# Patient Record
Sex: FEMALE | Race: WHITE
[De-identification: names, ages, dates, MRNs, and addresses within clinical notes are randomized per-mention and may not be internally consistent; named-entity substitution may affect disease eponyms.]

---

## 2018-01-09 ENCOUNTER — HOSPITAL ENCOUNTER (OUTPATIENT)
Dept: HOSPITAL 47 - RADUSWWP | Age: 83
End: 2018-01-09
Attending: SURGERY
Payer: MEDICARE

## 2018-01-09 VITALS — RESPIRATION RATE: 16 BRPM

## 2018-01-09 VITALS — HEART RATE: 64 BPM | DIASTOLIC BLOOD PRESSURE: 73 MMHG | TEMPERATURE: 97.6 F | SYSTOLIC BLOOD PRESSURE: 137 MMHG

## 2018-01-09 VITALS — BODY MASS INDEX: 18.8 KG/M2

## 2018-01-09 DIAGNOSIS — N60.31: Primary | ICD-10-CM

## 2018-01-09 DIAGNOSIS — Z78.0: ICD-10-CM

## 2018-01-09 DIAGNOSIS — Z85.9: ICD-10-CM

## 2018-01-09 PROCEDURE — 88305 TISSUE EXAM BY PATHOLOGIST: CPT

## 2018-01-09 PROCEDURE — 77065 DX MAMMO INCL CAD UNI: CPT

## 2018-01-09 PROCEDURE — 19083 BX BREAST 1ST LESION US IMAG: CPT

## 2018-01-09 NOTE — USB
ULTRASOUND GUIDED CORE BIOPSY RIGHT BREAST:

 

CLINICAL HISTORY: Abnormal ultrasound

 

FINDINGS: 

The procedure was explained to the patient.  The risks, complications, benefits 
and alternatives were discussed and any questions were answered.  Informed 
consent was obtained.  Patient was placed supine on the ultrasound table and 
prepped and draped in the usual sterile fashion.  Utilizing a 14 gauge needle, 
five passes were made into the area of concern in the subareolar portion right 
breast. Previously reported 5:00 lesion was not seen or reproducible on today's 
exam and therefore cannot be biopsied. Biopsy clip was placed post procedural 
and subsequent mammogram demonstrated the clip to be in ideal position.

 

Patient was stable throughout the procedure.  Pathology is pending.

 

All elements of maximal barrier and sterile technique were utilized.

 

IMPRESSION: 

1.  Successful ultrasound guided core biopsy of the subareolar right breast 
requested lesion.

 

Pathology Results: Benign



BREAST, RIGHT, CORE BIOPSY: BENIGN BREAST AND FIBROADIPOSE TISSUE WITH 
PROMINENT FIBROSIS. 



Recommendation

Follow up ultrasound of the right breast in 6 months.

Surgical consult to assess nipple discharge symptoms.
PRADIP

## 2018-01-09 NOTE — MM
Reason for exam: additional evaluation requested from abnormal screening.

Last mammogram was performed 1 month ago.



History:

Patient is postmenopausal and has history of other cancer at age 40.

Took estrogen for 15 years.



[facility] Diagnostic Mammo RT Wo CAD

CC and MLO view(s) were taken of the right breast.

Prior study comparison: December 13, 2017, bilateral MG 3d diag mammo w/cad [facility].  

December 12, 2016, bilateral MG 3d screening mammo w/cad.



ASSESSMENT: Post procedure mammogram for marker placement



RECOMMENDATION:

Ultrasound of the right breast in 6 months.

PENDING PATHOLOGY RESULTS.

## 2019-09-29 ENCOUNTER — HOSPITAL ENCOUNTER (EMERGENCY)
Dept: HOSPITAL 47 - EC | Age: 84
Discharge: HOME | End: 2019-09-29
Payer: MEDICARE

## 2019-09-29 VITALS — DIASTOLIC BLOOD PRESSURE: 78 MMHG | TEMPERATURE: 98 F | SYSTOLIC BLOOD PRESSURE: 120 MMHG | HEART RATE: 68 BPM

## 2019-09-29 VITALS — RESPIRATION RATE: 18 BRPM

## 2019-09-29 DIAGNOSIS — R06.02: ICD-10-CM

## 2019-09-29 DIAGNOSIS — Z88.5: ICD-10-CM

## 2019-09-29 DIAGNOSIS — S22.030A: Primary | ICD-10-CM
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
## 2019-09-29 DIAGNOSIS — F02.80: ICD-10-CM

## 2019-09-29 DIAGNOSIS — E07.9: ICD-10-CM

## 2019-09-29 DIAGNOSIS — Z79.899: ICD-10-CM

## 2019-09-29 DIAGNOSIS — R07.9: ICD-10-CM

## 2019-09-29 DIAGNOSIS — H53.8: ICD-10-CM

## 2019-09-29 DIAGNOSIS — G30.9: ICD-10-CM

## 2019-09-29 DIAGNOSIS — Z79.890: ICD-10-CM

## 2019-09-29 DIAGNOSIS — S32.010A: ICD-10-CM

## 2019-09-29 PROCEDURE — 72170 X-RAY EXAM OF PELVIS: CPT

## 2019-09-29 PROCEDURE — 72125 CT NECK SPINE W/O DYE: CPT

## 2019-09-29 PROCEDURE — 71046 X-RAY EXAM CHEST 2 VIEWS: CPT

## 2019-09-29 PROCEDURE — 99284 EMERGENCY DEPT VISIT MOD MDM: CPT

## 2019-09-29 PROCEDURE — 72131 CT LUMBAR SPINE W/O DYE: CPT

## 2019-09-29 PROCEDURE — 70450 CT HEAD/BRAIN W/O DYE: CPT

## 2019-09-29 PROCEDURE — 72100 X-RAY EXAM L-S SPINE 2/3 VWS: CPT

## 2019-09-29 NOTE — CT
EXAMINATION TYPE: CT lumbar spine wo con

 

DATE OF EXAM: 9/29/2019 10:18 AM

 

COMPARISON: None.

 

HISTORY: L1 FX, Fall

 

CT DLP: 462.8 mGycm

Automated exposure control for dose reduction was used.

 

Unenhanced CT of the lumbar spine was performed.  Bone and soft tissue window settings are submitted 
as well as coronal and sagittal reconstructions. 

 

FINDINGS: There are tiny, bilateral pleural effusions with associated relaxation atelectasis. The hea
rt is enlarged. Paraspinal soft tissues are otherwise unremarkable.

 

At T11-12, there is a superior endplate of fracture and the T12 vertebral body. This appears chronic.
 There is no paraspinal soft tissue mass or clear fracture line visible.

 

At T12-L1, there is a superior endplate infraction of T12. There is a faint fracture line through the
 vertebral body. I'm unsure the chronicity of this lesion. There is no paraspinal soft tissue mass.

 

L1-L2: No definite abnormality is seen.

 

L2-L3: There is a diffuse disc displacement. Intervertebral foramina are well maintained. There is mi
ld hypertrophic change in the facets.

 

L3-L4: Intervertebral foramina are widely patent. There is a broad-based disc protrusion. There is hy
pertrophic changes in the facets. There is moderate central canal stenosis.

 

L4-L5: There is a grade 1 bordering on grade 2 degenerative spondylolisthesis of L4 and L5. There is 
a prominent pseudodisc. There are marked hypertrophic changes within the facets. Intervertebral rio
sujit appear maintained.

 

L5-S1: There is degenerative disc disease. There is no cyst significant compressive discopathy. Inter
vertebral foramina appear well maintained. There are hypertrophic changes

 

IMPRESSION:

 

1. SEVERE WEDGE COMPRESSION FRACTURE OF T11 APPEARS CHRONIC.

2. SUPERIOR ENDPLATE INFRACTION OF L1 IS AGE-INDETERMINATE. FRACTURE LINE IS STILL VISIBLE WITHOUT PA
RASPINAL HEMATOMA OR SOFT TISSUE MASS.

3. DEGENERATIVE DISC DISEASE AND FACET ARTHROPATHY THROUGHOUT THE LUMBAR SPINE.

4. GRADE 1 BORDERING ON GRADE 2 SPONDYLOLISTHESIS OF L4 ON L5 WHICH IS DEGENERATIVE.

5. MODERATE CENTRAL CANAL STENOSIS, L3-4

6. SMALL, BILATERAL PLEURAL EFFUSIONS.

## 2019-09-29 NOTE — XR
EXAMINATION TYPE: XR lumbar spine 2 or 3V , 3 VIEWS

 

DATE OF EXAM ORDERED: 9/29/2019

 

HISTORY: pain.

 

COMPARISON: None.

 

FINDINGS:  There is a degenerative grade 1 bordering on grade 2 spondylolisthesis of L4 on L5. Verteb
ral body height and alignment are otherwise maintained. There are superior endplate infractions at T1
2 and L1, age not determined. There is no spondylolisthesis. There is facet arthropathy at L4-5 and L
5-S1. The pedicles are intact.

 

Incidental note is made of calcification of the right upper quadrant which may represent gallstones.

 

IMPRESSION: 

1. SUPER ENDPLATE INFRACTIONS OF T12 AND L1, AGE NOT DETERMINED.

2. GRADE 1 BORDERING ON GRADE 2 SPONDYLOLISTHESIS OF L4 AND L5.

3. PROBABLE CHOLELITHIASIS

## 2019-09-29 NOTE — XR
EXAMINATION TYPE: XR pelvis AP view , ONE VIEW

 

DATE OF EXAM ORDERED: 9/29/2019

 

HISTORY: fall low back pain.

 

COMPARISON: None.

 

FINDINGS:  Bony structures about the pelvis are unremarkable. No fracture or dislocation is seen. Hip
 joints are maintained.

 

IMPRESSION: 

 

NORMAL PELVIS.

## 2019-09-29 NOTE — ED
Fall HPI





- General


Chief Complaint: Fall


Stated Complaint: Fall


Time Seen by Provider: 19 07:32


Source: patient


Mode of arrival: EMS





- History of Present Illness


Initial Comments: 


84 year old female presents emergency department for chief complaint of fall.  

Patient states that she has a bedside commode which she is supposed to use ever 

she wants walk to the bathroom she states that she used her walker lost her 

balance falling onto her bottom.  Patient complaining of low back pain. Denies 

hip pain. Denies knee, ankle or UE pain.  Patient states she did not have neck 

pain until the c-collar was applied, she states it is very uncomfortable 

especially with her head tremors (history of possible parkinsons).  Patient 

denies any headache dizziness.  Patient has a chest pain shortness of breath, 

visual changes. Patient unsure if she hit her head. Denies LOC. Denies 

anticoagulation use. Patient has no other complaints. Appears well on arrival c-

collar in place.








- Related Data


                                Home Medications











 Medication  Instructions  Recorded  Confirmed


 


Latanoprost Ophth [Xalatan 0.005%] 1 drops BOTH EYES HS 16


 


Levothyroxine Sodium [Tirosint] 25 mcg PO DAILY 16


 


Donepezil [Aricept] 10 mg PO DAILY 19


 


QUEtiapine [SEROquel] 12.5 mg PO DAILY@1600 19











                                    Allergies











Allergy/AdvReac Type Severity Reaction Status Date / Time


 


codeine AdvReac  Nausea & Verified 19 08:10





   Vomiting  














Review of Systems


ROS Statement: 


Those systems with pertinent positive or pertinent negative responses have been 

documented in the HPI.





ROS Other: All systems not noted in ROS Statement are negative.





Past Medical History


Past Medical History: Dementia, Thyroid Disorder


Additional Past Medical History / Comment(s): alzheimers


History of Any Multi-Drug Resistant Organisms: None Reported


Past Surgical History:  Section, Hysterectomy


Past Anesthesia/Blood Transfusion Reactions: No Reported Reaction


Past Psychological History: No Psychological Hx Reported


Smoking Status: Never smoker


Past Alcohol Use History: None Reported


Past Drug Use History: None Reported





General Exam





- General Exam Comments


Initial Comments: 


General:  The patient is awake and alert, in no distress, and does not appear 

acutely ill. 


Eye:  +3 mm pupils are equal, round and reactive to light, extra-ocular 

movements are intact.  No nystagmus. No APD.  There is normal conjunctiva 

bilaterally.  No signs of icterus.  


Ears, nose, mouth and throat:  There are moist mucous membranes and no oral 

lesions.  No raccoon or Thorpe sign.  No scalp contusions abrasions or 

lacerations. 


Neck:  The neck is supple, there is no tenderness or JVD.  


Cardiovascular:  There is a regular rate and rhythm. No murmur, rub or gallop is

 appreciated.


Respiratory:  Lungs are clear to auscultation, respirations are non-labored, 

breath sounds are equal.  No wheezes, stridor, rales, or rhonchi.


Gastrointestinal:  Soft, non-distended, non-tender abdomen without masses or 

organomegaly noted. There is no rebound or guarding present.  


Musculoskeletal: Normal inspection of the cervical thoracic and lumbar spine.  

Patient has pain over the upper lumbar spine.  Patient has no thoracic pain no 

midline tenderness to patient the cervical spine or paravertebral.  Normal ROM, 

no tenderness.  Strength 5/5 of the LE b/l able to stand but pain with walking. 

Sensation intact of the LE b/l including saddle region. Radial pulses equal 

bilaterally 2+.  


Neurological:  A&O x 2 difficulty with date (normal per ). CN II-XII int

act, There are no obvious motor or sensory deficits. Hand tremors. Speech is 

normal. Head tremor


Skin:  Skin is warm and dry and no rashes or lesions are noted. 


Psychiatric:  Cooperative, appropriate mood & affect, normal judgment.  





Limitations: altered mental status





Course


                                   Vital Signs











  19





  07:31 09:52 11:06


 


Temperature 97.6 F  


 


Pulse Rate 77 81 62


 


Respiratory 20 18 18





Rate   


 


Blood Pressure 147/80 138/74 118/59


 


O2 Sat by Pulse 96 94 L 94 L





Oximetry   














  19





  12:45


 


Temperature 98 F


 


Pulse Rate 68


 


Respiratory 18





Rate 


 


Blood Pressure 120/78


 


O2 Sat by Pulse 98





Oximetry 














Medical Decision Making





- Medical Decision Making


84-year-old female presenting for chief complaint of fall.  Patient denies head 

injury however she states she is not certain.  Patient does have history of 

dementia.  No loss of conscious.  No anticoagulation use.  Patient states his 

mechanical fall she lost her balance when using her walker to ambulate.  Patient

 has traumatic examination otherwise is no focalizing neurological symptoms.  

Patient states she fell on her bottom complaining of low back pain there is 

point localized midline lumbar pain.  X-rays reveal a possible acute L1 fracture

 I feel this clinically correlates to be acute.  We contacted on-call orthopedic

 surgeon physician assistant Philip Sweeney who spoke with his attending reviewed 

imaging studies.  He recommended LSO brace and f/u in office. Patient guardian 

requesting discharge home. Patient stays at a group home. Patient is agreeable 

with this care plan, refuses pain medications stating codeine makes her itch, 

patient instructed to take ibuprofen outpatient. Patient cased discussed with 

Dr. Alfonso who reviewed imaging studies and reports. He is agreeable with 

discharge at this time. 








Disposition


Clinical Impression: 


 Fall, L1 vertebral fracture, Compression fracture of T3 vertebra





Disposition: HOME SELF-CARE


Condition: Good


Instructions (If sedation given, give patient instructions):  Vertebral 

Compression Fracture (ED), Fall Prevention for Older Adults (ED)


Additional Instructions: 


Please use medication as discussed.  Please follow-up with family doctor in the 

next 2 days, and Dr Zeng in office in 1-2 weeks. Please keep brace on while 

ambulating. Please return to emergency room if the symptoms increase or worsen 

or for any other concerns.


Is patient prescribed a controlled substance at d/c from ED?: No


Referrals: 


Jose Perez MD [Primary Care Provider] - 1-2 days


LEE Zeng DO [Doctor of Osteopathic Medicine] - 1-2 days


Time of Disposition: 12:20

## 2019-09-29 NOTE — XR
EXAMINATION TYPE: XR chest 2V

 

DATE OF EXAM: 9/29/2019

 

HISTORY: fall.

 

REFERENCE: Previous study dated 7/30/2010.

 

FINDINGS: There is minimal left basilar atelectasis. The lungs are otherwise clear. Pleural spaces ar
e clear. The heart is mildly enlarged. No acute osseous lesion is seen.

 

IMPRESSION: 

1. PLATELIKE ATELECTASIS, LEFT LUNG BASE.

2. MILD CARDIOMEGALY.

## 2019-09-29 NOTE — CT
EXAMINATION TYPE: CT brain erika serrano con

 

DATE OF EXAM: 9/29/2019

 

COMPARISON: Previous study dated 7/23/2016.

 

HISTORY: Fall today.  head and neck pain

 

CT DLP: 1297.3 mGycm

Automated exposure control for dose reduction was used.

 

TECHNIQUE: CT scan of the head and cervical spine are performed without contrast.

 

FINDINGS:  

BRAIN: There are generalized changes of sulcal prominence and ventriculomegaly, compatible with atrop
hic change. There is diffuse periventricular white matter lucency, compatible with chronic white rick
er ischemic change. There is no acute focal lesion, mass effect or midline shift identified. I do not
 see evidence of intracranial blood.

 

Visualized portions of the paranasal sinuses and mastoids are clear. The bony calvarium is intact.

 

IMPRESSION:

1. NO ACUTE INTRACRANIAL ABNORMALITY.

2. DEGENERATIVE CHANGE.

 

CERVICAL SPINE: Visualized portions of the lungs are clear.

 

Prevertebral soft tissues are normal.

 

There is a loss of the normal cervical lordosis. There is a minimal anterolisthesis of C3 on C4 and a
n antegrade and grade 1 listhesis of C5 on C6. Alignment is otherwise maintained. Atlantoaxial relati
onships are normal. There is diffuse degenerative disc disease and hypertrophic spondylosis throughou
t the cervical spine with relative sparing C2-3 level. There is mild, diffuse degenerative disc disea
se. There is mild facet arthropathy at C3-4 level. There is right-sided facet arthropathy at C4-5 lev
el. No definite protrusion is seen. There is a wedge compression fracture of the T3 vertebral body. T
his is wedged by approximately 30%. This was not visualized on the previous examination as the study 
did not extend this far inferiorly. It appears is likely chronic on this study. No other fractures ar
e seen.

 

IMPRESSION:

1. 30% WEDGE COMPRESSION FRACTURE OF THE T3 VERTEBRAL BODY, AGE NOT DETERMINED. PLEASE CORRELATE CLIN
ICALLY.

2. DEGENERATIVE CHANGE.

## 2019-12-28 ENCOUNTER — HOSPITAL ENCOUNTER (INPATIENT)
Dept: HOSPITAL 47 - EC | Age: 84
LOS: 2 days | Discharge: HOME | DRG: 313 | End: 2019-12-30
Attending: HOSPITALIST | Admitting: HOSPITALIST
Payer: MEDICARE

## 2019-12-28 VITALS — BODY MASS INDEX: 17.5 KG/M2

## 2019-12-28 DIAGNOSIS — Z79.890: ICD-10-CM

## 2019-12-28 DIAGNOSIS — R07.89: Primary | ICD-10-CM

## 2019-12-28 DIAGNOSIS — Z79.899: ICD-10-CM

## 2019-12-28 DIAGNOSIS — E86.0: ICD-10-CM

## 2019-12-28 DIAGNOSIS — Z90.710: ICD-10-CM

## 2019-12-28 DIAGNOSIS — F02.80: ICD-10-CM

## 2019-12-28 DIAGNOSIS — Z91.81: ICD-10-CM

## 2019-12-28 DIAGNOSIS — I95.9: ICD-10-CM

## 2019-12-28 DIAGNOSIS — G30.9: ICD-10-CM

## 2019-12-28 DIAGNOSIS — Z88.5: ICD-10-CM

## 2019-12-28 DIAGNOSIS — I10: ICD-10-CM

## 2019-12-28 DIAGNOSIS — E03.9: ICD-10-CM

## 2019-12-28 DIAGNOSIS — N17.9: ICD-10-CM

## 2019-12-28 DIAGNOSIS — R01.1: ICD-10-CM

## 2019-12-28 DIAGNOSIS — E78.5: ICD-10-CM

## 2019-12-28 LAB
ALBUMIN SERPL-MCNC: 3.9 G/DL (ref 3.5–5)
ALP SERPL-CCNC: 66 U/L (ref 38–126)
ALT SERPL-CCNC: 18 U/L (ref 4–34)
ANION GAP SERPL CALC-SCNC: 7 MMOL/L
APTT BLD: 23.9 SEC (ref 22–30)
AST SERPL-CCNC: 31 U/L (ref 14–36)
BASOPHILS # BLD AUTO: 0.1 K/UL (ref 0–0.2)
BASOPHILS NFR BLD AUTO: 1 %
BUN SERPL-SCNC: 28 MG/DL (ref 7–17)
CALCIUM SPEC-MCNC: 10 MG/DL (ref 8.4–10.2)
CHLORIDE SERPL-SCNC: 106 MMOL/L (ref 98–107)
CO2 SERPL-SCNC: 25 MMOL/L (ref 22–30)
EOSINOPHIL # BLD AUTO: 0.2 K/UL (ref 0–0.7)
EOSINOPHIL NFR BLD AUTO: 2 %
ERYTHROCYTE [DISTWIDTH] IN BLOOD BY AUTOMATED COUNT: 4.18 M/UL (ref 3.8–5.4)
ERYTHROCYTE [DISTWIDTH] IN BLOOD: 12.9 % (ref 11.5–15.5)
GLUCOSE SERPL-MCNC: 100 MG/DL (ref 74–99)
HCT VFR BLD AUTO: 39.3 % (ref 34–46)
HGB BLD-MCNC: 12.5 GM/DL (ref 11.4–16)
INR PPP: 1 (ref ?–1.2)
LYMPHOCYTES # SPEC AUTO: 1.7 K/UL (ref 1–4.8)
LYMPHOCYTES NFR SPEC AUTO: 24 %
MAGNESIUM SPEC-SCNC: 2.2 MG/DL (ref 1.6–2.3)
MCH RBC QN AUTO: 29.8 PG (ref 25–35)
MCHC RBC AUTO-ENTMCNC: 31.7 G/DL (ref 31–37)
MCV RBC AUTO: 93.8 FL (ref 80–100)
MONOCYTES # BLD AUTO: 0.5 K/UL (ref 0–1)
MONOCYTES NFR BLD AUTO: 8 %
NEUTROPHILS # BLD AUTO: 4.4 K/UL (ref 1.3–7.7)
NEUTROPHILS NFR BLD AUTO: 63 %
PLATELET # BLD AUTO: 240 K/UL (ref 150–450)
POTASSIUM SERPL-SCNC: 4.7 MMOL/L (ref 3.5–5.1)
PROT SERPL-MCNC: 6.9 G/DL (ref 6.3–8.2)
PT BLD: 10.4 SEC (ref 9–12)
SODIUM SERPL-SCNC: 138 MMOL/L (ref 137–145)
WBC # BLD AUTO: 7 K/UL (ref 3.8–10.6)

## 2019-12-28 PROCEDURE — 84484 ASSAY OF TROPONIN QUANT: CPT

## 2019-12-28 PROCEDURE — 71275 CT ANGIOGRAPHY CHEST: CPT

## 2019-12-28 PROCEDURE — 80061 LIPID PANEL: CPT

## 2019-12-28 PROCEDURE — 80053 COMPREHEN METABOLIC PANEL: CPT

## 2019-12-28 PROCEDURE — 85025 COMPLETE CBC W/AUTO DIFF WBC: CPT

## 2019-12-28 PROCEDURE — 85730 THROMBOPLASTIN TIME PARTIAL: CPT

## 2019-12-28 PROCEDURE — 99285 EMERGENCY DEPT VISIT HI MDM: CPT

## 2019-12-28 PROCEDURE — 85610 PROTHROMBIN TIME: CPT

## 2019-12-28 PROCEDURE — 93306 TTE W/DOPPLER COMPLETE: CPT

## 2019-12-28 PROCEDURE — 85379 FIBRIN DEGRADATION QUANT: CPT

## 2019-12-28 PROCEDURE — 96360 HYDRATION IV INFUSION INIT: CPT

## 2019-12-28 PROCEDURE — 71046 X-RAY EXAM CHEST 2 VIEWS: CPT

## 2019-12-28 PROCEDURE — 80048 BASIC METABOLIC PNL TOTAL CA: CPT

## 2019-12-28 PROCEDURE — 36415 COLL VENOUS BLD VENIPUNCTURE: CPT

## 2019-12-28 PROCEDURE — 83735 ASSAY OF MAGNESIUM: CPT

## 2019-12-28 PROCEDURE — 93005 ELECTROCARDIOGRAM TRACING: CPT

## 2019-12-28 PROCEDURE — 96372 THER/PROPH/DIAG INJ SC/IM: CPT

## 2019-12-28 NOTE — ED
General Adult HPI





- General


Source: patient, family, RN notes reviewed


Mode of arrival: ambulatory


Limitations: altered mental status





<Matheus Quick P - Last Filed: 19 21:47>





<Sravani Gentile P - Last Filed: 19 05:53>





- General


Chief complaint: Chest Pain


Stated complaint: Chest pain


Time Seen by Provider: 19 20:08





- History of Present Illness


Initial comments: 





84-year-old female with a past medical history of Alzheimer's presents to the 

emergency department for a chief complaint of chest pain.  Patient started to 

experience chest pain about 2 hours prior to arrival.  States he was a sharp 

pain in the anterior chest.  Denies shortness of breath.  Patient denies any 

cardiac history.  Patient is staying at an adult foster care facility and they 

called an ambulance.  Patient was given aspirin in the EMS.  Upon presentation 

to the emergency department pain has resolved.  Patient does not have cardiac 

history.  Patient does not know of any previous cardiac catheterizations or 

echo.Patient has no other complaints at this time including shortness of breath,

abdominal pain, nausea or vomiting, headache, or visual changes. (Matheus Quick)





- Related Data


                                Home Medications











 Medication  Instructions  Recorded  Confirmed


 


Latanoprost Ophth [Xalatan 0.005%] 1 drops BOTH EYES HS@1900 16


 


Levothyroxine Sodium [Tirosint] 25 mcg PO DAILY@0716


 


Donepezil [Aricept] 10 mg PO DAILY@19


 


QUEtiapine [SEROquel] 37.5 mg PO DAILY@19


 


Benedicto/D3/Mag11/Zinc//Shamar/Bor 1 tab PO DAILY@19





[Caltrate 600+D Plus Tablet]   


 


Cholecalciferol (Vitamin D3) 2,000 unit PO DAILY@19





[Vitamin D3]   


 


Docusate [Colace] 100 mg PO DAILY@0719


 


Escitalopram [Lexapro] 10 mg PO DAILY@0719


 


Ibuprofen [Motrin] 400 mg PO QID@04,10,16,22 19


 


QUEtiapine [SEROquel] 50 mg PO DAILY@1600 19











                                    Allergies











Allergy/AdvReac Type Severity Reaction Status Date / Time


 


codeine AdvReac  Nausea & Verified 19 22:19





   Vomiting  














Review of Systems


ROS Other: All systems not noted in ROS Statement are negative.





<Matheus Quick P - Last Filed: 19 21:47>


ROS Other: All systems not noted in ROS Statement are negative.





<Sravani Gentile P - Last Filed: 19 05:53>


ROS Statement: 


Those systems with pertinent positive or pertinent negative responses have been 

documented in the HPI.








Past Medical History


Past Medical History: Dementia, Thyroid Disorder


Additional Past Medical History / Comment(s): alzheimers


History of Any Multi-Drug Resistant Organisms: None Reported


Past Surgical History:  Section, Hysterectomy


Past Anesthesia/Blood Transfusion Reactions: No Reported Reaction


Past Psychological History: No Psychological Hx Reported


Smoking Status: Never smoker


Past Alcohol Use History: None Reported


Past Drug Use History: None Reported





<Matheus Quick P - Last Filed: 19 21:47>





General Exam


Limitations: altered mental status


General appearance: alert, in no apparent distress


Head exam: Present: atraumatic, normocephalic, normal inspection


Eye exam: Present: normal appearance, PERRL, EOMI.  Absent: scleral icterus, c

onjunctival injection, periorbital swelling


ENT exam: Present: normal exam, mucous membranes moist


Neck exam: Present: normal inspection, full ROM.  Absent: tenderness, 

meningismus, lymphadenopathy


Respiratory exam: Present: normal lung sounds bilaterally.  Absent: respiratory 

distress, wheezes, rales, rhonchi, stridor


Cardiovascular Exam: Present: regular rate, normal rhythm, normal heart sounds. 

 Absent: systolic murmur, diastolic murmur, rubs, gallop, clicks


GI/Abdominal exam: Present: soft, normal bowel sounds.  Absent: distended, 

tenderness, guarding, rebound, rigid


Neurological exam: Present: alert





<Matheus Quick P - Last Filed: 19 21:47>





Course





                                   Vital Signs











  19





  19:25 21:05 21:10


 


Temperature 98.2 F 97.6 F 


 


Pulse Rate 64 59 L 


 


Respiratory 18 18 





Rate   


 


Blood Pressure 144/65 91/52 136/61


 


O2 Sat by Pulse 95 96 





Oximetry   














  19





  21:50 23:39 04:03


 


Temperature  97.9 F 97.9 F


 


Pulse Rate 60 57 L 59 L


 


Respiratory 16 16 16





Rate   


 


Blood Pressure 152/65 146/63 145/62


 


O2 Sat by Pulse 97 95 95





Oximetry   














Medical Decision Making





- Lab Data


Result diagrams: 


                                 19 19:58





                                 19 19:58





<Matheus Quick - Last Filed: 19 21:47>





- Lab Data


Result diagrams: 


                                 19 19:58





                                 19 19:58





<Sravani Gentile - Last Filed: 19 05:53>





- Medical Decision Making





Vitals are stable.  There is 1 according of hypotension with a blood pressure of

 91/52 however this was repeated shortly after and was 136/61.  Patient's pain 

had resolved upon my seeing her.  CBC is unremarkable.  CMP does show some 

evidence of dehydration.  Troponin negative.  X-ray shows no active cardi

opulmonary disease.  EKG shows a normal sinus rhythm with a ventricular rate of 

60.  No ST elevation or depression.  Given recent onset of chest pain within 3 

hours of arrival patient will be admitted for trending troponin and cardiology 

consultation.  Discussed this case with Dr. Gentile (Matheus Quick)


I personally saw and evaluated the patient, this is a pleasantly demented 

hemodynamically stable 84-year-old who had reported chest pain.  Labs are 

relatively unremarkable there is some signs of dehydration IV fluids were 

ordered.  Patient will be patient and observation of her sterile troponins and 

evaluation by cardiology. (Sravani Gentile)





- Lab Data





                                   Lab Results











  19 Range/Units





  19:58 19:58 19:58 


 


WBC  7.0    (3.8-10.6)  k/uL


 


RBC  4.18    (3.80-5.40)  m/uL


 


Hgb  12.5    (11.4-16.0)  gm/dL


 


Hct  39.3    (34.0-46.0)  %


 


MCV  93.8    (80.0-100.0)  fL


 


MCH  29.8    (25.0-35.0)  pg


 


MCHC  31.7    (31.0-37.0)  g/dL


 


RDW  12.9    (11.5-15.5)  %


 


Plt Count  240    (150-450)  k/uL


 


Neutrophils %  63    %


 


Lymphocytes %  24    %


 


Monocytes %  8    %


 


Eosinophils %  2    %


 


Basophils %  1    %


 


Neutrophils #  4.4    (1.3-7.7)  k/uL


 


Lymphocytes #  1.7    (1.0-4.8)  k/uL


 


Monocytes #  0.5    (0-1.0)  k/uL


 


Eosinophils #  0.2    (0-0.7)  k/uL


 


Basophils #  0.1    (0-0.2)  k/uL


 


PT    10.4  (9.0-12.0)  sec


 


INR    1.0  (<1.2)  


 


APTT    23.9  (22.0-30.0)  sec


 


Sodium   138   (137-145)  mmol/L


 


Potassium   4.7   (3.5-5.1)  mmol/L


 


Chloride   106   ()  mmol/L


 


Carbon Dioxide   25   (22-30)  mmol/L


 


Anion Gap   7   mmol/L


 


BUN   28 H   (7-17)  mg/dL


 


Creatinine   1.12 H   (0.52-1.04)  mg/dL


 


Est GFR (CKD-EPI)AfAm   52   (>60 ml/min/1.73 sqM)  


 


Est GFR (CKD-EPI)NonAf   45   (>60 ml/min/1.73 sqM)  


 


Glucose   100 H   (74-99)  mg/dL


 


Calcium   10.0   (8.4-10.2)  mg/dL


 


Magnesium   2.2   (1.6-2.3)  mg/dL


 


Total Bilirubin   0.3   (0.2-1.3)  mg/dL


 


AST   31   (14-36)  U/L


 


ALT   18   (4-34)  U/L


 


Alkaline Phosphatase   66   ()  U/L


 


Troponin I     (0.000-0.034)  ng/mL


 


Total Protein   6.9   (6.3-8.2)  g/dL


 


Albumin   3.9   (3.5-5.0)  g/dL














  19 Range/Units





  19:58 


 


WBC   (3.8-10.6)  k/uL


 


RBC   (3.80-5.40)  m/uL


 


Hgb   (11.4-16.0)  gm/dL


 


Hct   (34.0-46.0)  %


 


MCV   (80.0-100.0)  fL


 


MCH   (25.0-35.0)  pg


 


MCHC   (31.0-37.0)  g/dL


 


RDW   (11.5-15.5)  %


 


Plt Count   (150-450)  k/uL


 


Neutrophils %   %


 


Lymphocytes %   %


 


Monocytes %   %


 


Eosinophils %   %


 


Basophils %   %


 


Neutrophils #   (1.3-7.7)  k/uL


 


Lymphocytes #   (1.0-4.8)  k/uL


 


Monocytes #   (0-1.0)  k/uL


 


Eosinophils #   (0-0.7)  k/uL


 


Basophils #   (0-0.2)  k/uL


 


PT   (9.0-12.0)  sec


 


INR   (<1.2)  


 


APTT   (22.0-30.0)  sec


 


Sodium   (137-145)  mmol/L


 


Potassium   (3.5-5.1)  mmol/L


 


Chloride   ()  mmol/L


 


Carbon Dioxide   (22-30)  mmol/L


 


Anion Gap   mmol/L


 


BUN   (7-17)  mg/dL


 


Creatinine   (0.52-1.04)  mg/dL


 


Est GFR (CKD-EPI)AfAm   (>60 ml/min/1.73 sqM)  


 


Est GFR (CKD-EPI)NonAf   (>60 ml/min/1.73 sqM)  


 


Glucose   (74-99)  mg/dL


 


Calcium   (8.4-10.2)  mg/dL


 


Magnesium   (1.6-2.3)  mg/dL


 


Total Bilirubin   (0.2-1.3)  mg/dL


 


AST   (14-36)  U/L


 


ALT   (4-34)  U/L


 


Alkaline Phosphatase   ()  U/L


 


Troponin I  <0.012  (0.000-0.034)  ng/mL


 


Total Protein   (6.3-8.2)  g/dL


 


Albumin   (3.5-5.0)  g/dL














Disposition


Is patient prescribed a controlled substance at d/c from ED?: No


Time of Disposition: 21:49





<Matheus Quick P - Last Filed: 19 21:47>





<Sravani Gentile P - Last Filed: 19 05:53>


Clinical Impression: 


 Chest pain





Disposition: ADMITTED AS IP TO THIS HOSP


Condition: Fair

## 2019-12-28 NOTE — XR
EXAMINATION TYPE: XR chest 2V

 

DATE OF EXAM: 12/28/2019

 

COMPARISON: 9/29/2019

 

HISTORY: Chest pain

 

TECHNIQUE: 2 views

 

FINDINGS: There is no heart failure nor confluent pneumonic infiltrate. Costophrenic angles are clear
. Thoracic aorta is atheromatous. Bony thorax is intact.

 

IMPRESSION: No active cardiopulmonary disease. No change.

## 2019-12-29 VITALS — RESPIRATION RATE: 16 BRPM

## 2019-12-29 LAB
ANION GAP SERPL CALC-SCNC: 5 MMOL/L
BUN SERPL-SCNC: 21 MG/DL (ref 7–17)
CALCIUM SPEC-MCNC: 9.5 MG/DL (ref 8.4–10.2)
CHLORIDE SERPL-SCNC: 108 MMOL/L (ref 98–107)
CHOLEST SERPL-MCNC: 168 MG/DL (ref ?–200)
CO2 SERPL-SCNC: 27 MMOL/L (ref 22–30)
GLUCOSE SERPL-MCNC: 81 MG/DL (ref 74–99)
HDLC SERPL-MCNC: 97 MG/DL (ref 40–60)
LDLC SERPL CALC-MCNC: 60 MG/DL (ref 0–99)
POTASSIUM SERPL-SCNC: 4.4 MMOL/L (ref 3.5–5.1)
SODIUM SERPL-SCNC: 140 MMOL/L (ref 137–145)
TRIGL SERPL-MCNC: 53 MG/DL (ref ?–150)

## 2019-12-29 RX ADMIN — HEPARIN SODIUM SCH UNIT: 5000 INJECTION, SOLUTION INTRAVENOUS; SUBCUTANEOUS at 16:00

## 2019-12-29 RX ADMIN — HEPARIN SODIUM SCH UNIT: 5000 INJECTION, SOLUTION INTRAVENOUS; SUBCUTANEOUS at 23:44

## 2019-12-29 RX ADMIN — LEVOTHYROXINE SODIUM SCH MCG: 25 TABLET ORAL at 08:53

## 2019-12-29 RX ADMIN — HEPARIN SODIUM SCH UNIT: 5000 INJECTION, SOLUTION INTRAVENOUS; SUBCUTANEOUS at 08:57

## 2019-12-29 RX ADMIN — CEFAZOLIN SCH: 330 INJECTION, POWDER, FOR SOLUTION INTRAMUSCULAR; INTRAVENOUS at 23:43

## 2019-12-29 RX ADMIN — CEFAZOLIN SCH MLS/HR: 330 INJECTION, POWDER, FOR SOLUTION INTRAMUSCULAR; INTRAVENOUS at 20:30

## 2019-12-29 RX ADMIN — LOSARTAN POTASSIUM SCH MG: 50 TABLET, FILM COATED ORAL at 15:07

## 2019-12-29 RX ADMIN — DOCUSATE SODIUM SCH MG: 100 CAPSULE, LIQUID FILLED ORAL at 08:53

## 2019-12-29 RX ADMIN — ESCITALOPRAM OXALATE SCH MG: 10 TABLET, FILM COATED ORAL at 08:53

## 2019-12-29 RX ADMIN — DONEPEZIL HYDROCHLORIDE SCH MG: 10 TABLET ORAL at 08:53

## 2019-12-29 NOTE — P.CRDCN
History of Present Illness


Consult date: 19


Reason for Consult (text): 


Chest pain





Consult reason: chest pain


Chief complaint: Chest pain


History of present illness: 


HISTORY OF PRESENT ILLNESS AND PLAN:








This is a 84 -year-old female with history of Alzheimer's dementia type, 

hypothyroidism, hypertension and recent complaints of chest pain.  Patient 

states she's been experiencing chest pain that is described as anterior mid-

sternal and radiates to her neck. Rates pain as 8/10.  Patient states chest pain

has been occurring for approximately past 2 days on/off with activity.  Patient 

does live in an adult foster care facility and EMS was called upon complaints of

chest pain. ASA given upon arrival to facility.  Patient is a poor historian.  

Patient utilizes walker for ambulation.  EKG shows sinus rhythm with no acute 

process.  Heart rate 60.  Troponin negative 3.  Current /75.  Afebrile.  

Saturation 95% on room air. Patient is currently comfortable resting in bed with

no current complaints of chest pain, chest pressure, shortness of breath or 

palpitations.  Patient able to get to the commode with assistance. Patient has 

not followed with cardiology historically. No smoking.  No DM.





SIGNIFICANT PAST MEDICAL HISTORY:  Alzheimer's dementia type, hypothyroidism and

recent complaints of chest pain.





PAST SURGICAL HISTORY:


See list.





EKG =   Sinus rhythm no acute process.  Heart rate 60. 


Troponins negative x  3





SIGNIFICANT LABORATORY VALUES: CBC, WNL. BUN 28, CR 1.12.  CHOL 168, LDL 60, HDL

97, TRIG 53. 





Chest x-ray = No acute process.





Most recent echo = None available. 


Most recent stress testing =  None available


 








REVIEW OF SYSTEMS: 





CONSTITUTIONAL: Denies fever. Denies chills.





EYES: Denies blurred vision. Denies blurred vision or vision changes. Denies eye

pain.


EARS, NOSE, MOUTH & THROAT: Denies headache. Denies sore throat. Denies ear pain

Denies hemoptysis.





CARDIOVASCULAR: Complains of prior chest pain. Complains of prior shortness of 

breath. Denies orthopnea. Denies PND. Denies palpitations.


RESPIRATORY: Denies cough. Complains of prior shortness of breath. 





GASTROINTESTINAL: Denies abdominal pain or distention. Denies diarrhea. Denies 

constipation. Denies nausea. Denies vomiting.


MUSCULOSKELETAL: Denies myalgias.


INTEGUMENTARY: Denies pruitis. Denies rash.





ENDOCRINE: Complains of fatigue. Denies weight change. Denies polydipsia. Denies

polyurina Denies heat/cold intolerance.


GENITOURINARY: Denies burning, hematuria or urgency with micturation.


HEMATOLOGIC: Denies history of anemia. Denies bleeding.





NEUROLOGIC: Denies numbness. Denies tingling. Complains of  weakness.


PSYCHIATRIC: Denies anxiety. Denies depression.





PHYSICAL EXAM:





VITAL SIGNS:  171/75 HR 60. Afebrile.  Saturation 95% on room air.





GENERAL: Well developed, in no acute distress.





HEENT: Head is atraumatic, normocephalic. Pupils are equal, round. Extra ocular 

movements intact. Mucous membranes moist. Neck supple. No JVD. No carotid bruit.

No thyromegaly.





LUNGS: Clear to auscultation. No wheezes, rales or rhonchi. No chest wall 

tenderness on palpation or with deep breathing.


HEART: Regular rate and rhythm, no rubs or gallops. S1 and S2 heard. II/VI 

systolic murmur at base with radation to LEFT carotid.





ABDOMEN: Abdominal exam, WNL. Bowel sounds x4 quads. Soft, non-tender, without 

masses, organomegaly, or abdominal aorta enlargement.


 


EXTREMITIES/VASCULAR: Extremities have easily palpable radial, femoral, dorsalis

pedis and posterior tibial pulses. No cyanosis, calf tenderness. No BLE edema.





NEUROLOGIC: Patient is awake, alert and oriented x3. No focal neurologic 

abnormalities.





FINAL IMPRESSION: 


1. Atypical chest pain


2. Hypertension


3. Hypothyroidism


4. Systolic Murmur II/VI - echo ordered.


5. Dementia








PLAN: Atypical chest pain. Due to pt age and risk factor CAD cannot be ruled 

out. Pt to echocardiography.  Will consider Lexiscan stress testing. START 

Metoprolol tartrate 12.5 mg daily. START Losartan 50 mg daily. DECREASE ASA to 

81 mg daily. STAT D-dimer to rule out PE. Continue SQ Heparin and IV hydration. 

Continue same all other medical/medication regime.








***Nurse Practitioner note has been reviewed by the Physician. Signing provider 

agrees with the documented findings, assessment and plan of care.








Past Medical History


Past Medical History: Dementia, Thyroid Disorder


Additional Past Medical History / Comment(s): alzheimers


History of Any Multi-Drug Resistant Organisms: None Reported


Past Surgical History:  Section, Hysterectomy


Past Anesthesia/Blood Transfusion Reactions: No Reported Reaction


Past Psychological History: No Psychological Hx Reported


Smoking Status: Never smoker


Past Alcohol Use History: None Reported


Past Drug Use History: None Reported





Medications and Allergies


                                Home Medications











 Medication  Instructions  Recorded  Confirmed  Type


 


Latanoprost Ophth [Xalatan 0.005%] 1 drops BOTH EYES HS@1900 16 

History


 


Levothyroxine Sodium [Tirosint] 25 mcg PO DAILY@0700 16 History


 


Donepezil [Aricept] 10 mg PO DAILY@0700 19 History


 


QUEtiapine [SEROquel] 37.5 mg PO DAILY@0700 19 History


 


Benedicto/D3/Mag11/Zinc//Shamar/Bor 1 tab PO DAILY@0700 19 History





[Caltrate 600+D Plus Tablet]    


 


Cholecalciferol (Vitamin D3) 2,000 unit PO DAILY@0700 19 History





[Vitamin D3]    


 


Docusate [Colace] 100 mg PO DAILY@0700 19 History


 


Escitalopram [Lexapro] 10 mg PO DAILY@0700 19 History


 


Ibuprofen [Motrin] 400 mg PO QID@04,10,16,22 19 History


 


QUEtiapine [SEROquel] 50 mg PO DAILY@1600 19 History








                                    Allergies











Allergy/AdvReac Type Severity Reaction Status Date / Time


 


codeine AdvReac  Nausea & Verified 19 22:19





   Vomiting  














Physical Exam


Vitals: 


                                   Vital Signs











  Temp Pulse Resp BP Pulse Ox


 


 19 12:00   70  18  131/56  100


 


 19 11:00   72  18  120/60  98


 


 19 10:30   70  18  122/63  98


 


 19 07:23  97.3 F L  66  18  171/75  95


 


 19 06:51  98.1 F  63  17  162/75  95


 


 19 04:03  97.9 F  59 L  16  145/62  95


 


 19 23:39  97.9 F  57 L  16  146/63  95


 


 19 21:50   60  16  152/65  97


 


 19 21:10     136/61 


 


 19 21:05  97.6 F  59 L  18  91/52  96


 


 19 19:25  98.2 F  64  18  144/65  95








                                Intake and Output











 19





 22:59 06:59 14:59


 


Other:   


 


  # Voids  1 


 


  Weight 68.039 kg  














Results





                                 19 19:58





                                 19 07:47


                                 Cardiac Enzymes











  19 Range/Units





  19:58 19:58 02:01 


 


AST  31    (14-36)  U/L


 


Troponin I   <0.012  <0.012  (0.000-0.034)  ng/mL














  19 Range/Units





  07:47 


 


AST   (14-36)  U/L


 


Troponin I  <0.012  (0.000-0.034)  ng/mL








                                   Coagulation











  19 Range/Units





  19:58 


 


PT  10.4  (9.0-12.0)  sec


 


APTT  23.9  (22.0-30.0)  sec








                                     Lipids











  19 Range/Units





  07:47 


 


Triglycerides  53  (<150)  mg/dL


 


Cholesterol  168  (<200)  mg/dL


 


HDL Cholesterol  97 H  (40-60)  mg/dL








                                       CBC











  19 Range/Units





  19:58 


 


WBC  7.0  (3.8-10.6)  k/uL


 


RBC  4.18  (3.80-5.40)  m/uL


 


Hgb  12.5  (11.4-16.0)  gm/dL


 


Hct  39.3  (34.0-46.0)  %


 


Plt Count  240  (150-450)  k/uL








                          Comprehensive Metabolic Panel











  19 Range/Units





  19:58 07:47 


 


Sodium  138  140  (137-145)  mmol/L


 


Potassium  4.7  4.4  (3.5-5.1)  mmol/L


 


Chloride  106  108 H  ()  mmol/L


 


Carbon Dioxide  25  27  (22-30)  mmol/L


 


BUN  28 H  21 H  (7-17)  mg/dL


 


Creatinine  1.12 H  0.96  (0.52-1.04)  mg/dL


 


Glucose  100 H  81  (74-99)  mg/dL


 


Calcium  10.0  9.5  (8.4-10.2)  mg/dL


 


AST  31   (14-36)  U/L


 


ALT  18   (4-34)  U/L


 


Alkaline Phosphatase  66   ()  U/L


 


Total Protein  6.9   (6.3-8.2)  g/dL


 


Albumin  3.9   (3.5-5.0)  g/dL








                               Current Medications











Generic Name Dose Route Start Last Admin





  Trade Name Thangq  PRN Reason Stop Dose Admin


 


Aspirin  81 mg  19 09:00 





  Aspirin  PO  





  DAILY [facility]  


 


Docusate Sodium  100 mg  19 07:00  19 08:53





  Colace  PO   100 mg





  DAILY@0700 JULIET   Administration


 


Donepezil HCl  10 mg  19 07:00  19 08:53





  Aricept  PO   10 mg





  DAILY@0700 JULIET   Administration


 


Escitalopram Oxalate  10 mg  19 07:00  19 08:53





  Lexapro  PO   10 mg





  DAILY@0700 [facility]   Administration


 


Heparin Sodium (Porcine)  5,000 unit  19 08:00  19 08:57





  Heparin  SQ   5,000 unit





  Q8HR JULIET   Administration


 


Sodium Chloride  1,000 mls @ 50 mls/hr  19 00:45 





  Saline 0.9%  IV  





  .Q20H [facility]  


 


Levothyroxine Sodium  25 mcg  19 07:00  19 08:53





  Synthroid  PO   25 mcg





  DAILY@0700 [facility]   Administration


 


Losartan Potassium  50 mg  19 12:45 





  Cozaar  PO  





  DAILY [facility]  


 


Metoprolol Tartrate  12.5 mg  19 09:00 





  Lopressor  PO  





  DAILY [facility]  


 


Nitroglycerin  0.4 mg  19 21:50 





  Nitrostat  SUBLINGUAL  





  Q5M PRN  





  Chest Pain  


 


Quetiapine Fumarate  37.5 mg  19 07:00  19 09:00





  Seroquel  PO   37.5 mg





  DAILY@0700 [facility]   Administration


 


Quetiapine Fumarate  50 mg  19 16:00 





  Seroquel  PO  





  DAILY@1600 [facility]  








                                Intake and Output











 19





 22:59 06:59 14:59


 


Other:   


 


  # Voids  1 


 


  Weight 68.039 kg  








                                        





                                 19 19:58 





                                 19 07:47 











- EKG Interpretation


EKG: sinus rhythm

## 2019-12-29 NOTE — P.HPIM
History of Present Illness


H&P Date: 19





The patient is an 84-year-old female with a PMH of Alzheimer's dementia, and 

hypothyroidism who presented to the ED from an adult foster care facility due to

chest pain.  Due to the patient's dementia, the history is somewhat limited, 

though the patient was able to answer most questions.  She reports that her 

chest pain started 2 hours prior to arrival to the ED, while she was sitting and

watching television.  The pain was 8/10, substernal, sharp, nonradiating, with 

some associated shortness of breath.  She denied any associated nausea, 

vomiting, diaphoresis, dizziness, or palpitations.  She reports that she may 

have had similar episodes in the past though they were not nearly as severe.  Th

e pain resolved shortly after coming to the emergency room and after having 

received nitroglycerin.  At time of interview, she reported that she feels back 

to baseline and had no active complaints.  She denied cough, fever, chills, leg 

pain.  She walks with a walker though has had several falls in the past.  She 

underwent an extensive evaluation in the emergency room with EKG showing normal 

sinus rhythm at 60 bpm with no ST/T-wave changes noted.  Chest x-ray was 

unremarkable.  Laboratory evaluation revealed a WBC count of 7.0, hemoglobin 

12.5, platelets 240, sodium 138, potassium 4.7, chloride 106, CO2 25, BUN 28, 

creatinine 1.12, and glucose 100.  She is being admitted to the medicine service

for further management of chest pain.





Review of Systems





Pertinent positives and negatives as discussed in HPI, a complete review of 

systems was performed and all other systems are negative.





Past Medical History


Past Medical History: Dementia, Thyroid Disorder


Additional Past Medical History / Comment(s): alzheimers


History of Any Multi-Drug Resistant Organisms: None Reported


Past Surgical History:  Section, Hysterectomy


Past Anesthesia/Blood Transfusion Reactions: No Reported Reaction


Past Psychological History: No Psychological Hx Reported


Smoking Status: Never smoker


Past Alcohol Use History: None Reported


Past Drug Use History: None Reported





Medications and Allergies


                                Home Medications











 Medication  Instructions  Recorded  Confirmed  Type


 


Latanoprost Ophth [Xalatan 0.005%] 1 drops BOTH EYES HS@1900 16 

History


 


Levothyroxine Sodium [Tirosint] 25 mcg PO DAILY@0700 16 History


 


Donepezil [Aricept] 10 mg PO DAILY@0700 19 History


 


QUEtiapine [SEROquel] 37.5 mg PO DAILY@0700 19 History


 


Benedicto/D3/Mag11/Zinc//Shamar/Bor 1 tab PO DAILY@0700 19 History





[Caltrate 600+D Plus Tablet]    


 


Cholecalciferol (Vitamin D3) 2,000 unit PO DAILY@0700 19 History





[Vitamin D3]    


 


Docusate [Colace] 100 mg PO DAILY@0700 19 History


 


Escitalopram [Lexapro] 10 mg PO DAILY@0700 19 History


 


Ibuprofen [Motrin] 400 mg PO QID@04,10,16,22 19 History


 


QUEtiapine [SEROquel] 50 mg PO DAILY@1600 19 History








                                    Allergies











Allergy/AdvReac Type Severity Reaction Status Date / Time


 


codeine AdvReac  Nausea & Verified 19 22:19





   Vomiting  














Physical Exam


Vitals: 


                                   Vital Signs











  Temp Pulse Resp BP Pulse Ox


 


 19 23:39  97.9 F  57 L  16  146/63  95


 


 19 21:50   60  16  152/65  97


 


 19 21:10     136/61 


 


 19 21:05  97.6 F  59 L  18  91/52  96


 


 19 19:25  98.2 F  64  18  144/65  95








                                Intake and Output











 19





 14:59 22:59 06:59


 


Other:   


 


  Weight  68.039 kg 














General: non toxic, no distress, appears at stated age


Derm: no unusual rashes/lesions no unusual ecchymoses, warm, dry


Head: atraumatic, normocephalic, symmetric


Eyes: EOMI, no lid lag, anicteric sclera, pupils equal round reactive to light


ENT: Nose and ears atraumatic, no thrush,  no pharyngeal erythema


Neck: No thyromegaly, no cervical lymphadenopathy, trachea midline, supple


Mouth: no lip lesion, mucus membranes dry


Cardiovascular: S1S2 reg, no murmur, positive posterior tibial pulse bilateral, 

no edema, capillary refill less than 2 seconds


Lungs: CTA bilateral, no rhonchi, no rales , no accessory muscle use


Abdominal: soft,  nontender to palpation, no guarding, no appreciable organomeg

angelica, normal bowel sounds


Ext: no gross muscle atrophy,  muscle strength 5 out of 5 in all 4 extremities 

grossly, no contractures, 


Neuro: Head tremor along with hypokinesis diffusely, CN II-XI grossly intact, 

light touch intact all 4 extremities, mild intention tremor during finger to 

nose testing


Psych: Alert, awake, oriented to self and place, is able to name the hospital, 

though believes it is 





Results


CBC & Chem 7: 


                                 19 19:58





                                 19 19:58


Labs: 


                  Abnormal Lab Results - Last 24 Hours (Table)











  19 Range/Units





  19:58 


 


BUN  28 H  (7-17)  mg/dL


 


Creatinine  1.12 H  (0.52-1.04)  mg/dL


 


Glucose  100 H  (74-99)  mg/dL














Assessment and Plan


Plan: 





Chest pain, rule out ACS


-Continue with aspirin and nitroglycerin when necessary


-Cardiac monitoring


-Cardiology consult


-Trend troponin





DOUG


-Likely due to dehydration


-Monitor BMP


-C/w gentle hydration





Chronic conditions: Alzheimer's dementia, hypothyroidism


-Continue with home meds


-Fall precautions





DVT prophylaxis


-Heparin subq





The patient is admitted with an anticipated less than 2 midnight stay for ev

aluation of chest pain


CODE STATUS: Full Code


Discussed with: Patient


Anticipated discharge date: 1-2 days


Anticipated discharge place: Adult foster care


A total of 35 minutes was spent on the care of this complex patient more than 

50% of the time was spent in counseling and care coordination.

## 2019-12-29 NOTE — P.PN
Subjective


Progress Note Date: 12/29/19


Principal diagnosis: 





Chest pain





Patient has not had any further episodes of chest pain since coming in. No 

shortness of breath.





Objective





- Vital Signs


Vital signs: 


                                   Vital Signs











Temp  97.3 F L  12/29/19 07:23


 


Pulse  66   12/29/19 07:23


 


Resp  18   12/29/19 07:23


 


BP  171/75   12/29/19 07:23


 


Pulse Ox  95   12/29/19 07:23








                                 Intake & Output











 12/28/19 12/29/19 12/29/19





 18:59 06:59 18:59


 


Weight  68.039 kg 


 


Other:   


 


  # Voids  1 














- Exam





Constitutional: No acute distress, conversant, pleasant


Eyes:Anicteric sclerae, moist conjunctiva, no lid-lag, PERRLA, 


ENMT: Oropharynx clear, no erythema, exudates


Neck: Supple, FROM, no masses, or JVD, No carotid bruits, No thyromegaly


Lungs: Clear to auscultation, Clear to percussion, Normal respiratory effort, no

accessory muscle use 


Cardiovascular: Heart regular in rate and rhythm, No murmurs, gallops, or rubs, 

No peripheral edema


Abdominal: Soft, Nontender, no guarding, rebound or rigidity, Normoactive bowel 

sounds, No hepatomegaly, No splenomegaly, No palpable mass 


Skin: Normal temperature, tone, texture, turgor, no induration, No subcutaneous 

nodules, No rash, lesions, No ulcers


Extremities: No digital cyanosis, No clubbing, Pedal pulses intact and 

symmetrical, Radial pulses intact and symmetrical, No calf tenderness 


Psychiatric: Alert and oriented to person, place and time, appropriate affect, 

intact judgement         


Neuro: Muscles Strength 5/5 in all 4 extremities, Sensation to light touch 

grossly present throughout, Cranial nerves II-XII grossly intact, no focal 

sensory deficits








- Labs


CBC & Chem 7: 


                                 12/28/19 19:58





                                 12/29/19 07:47


Labs: 


                  Abnormal Lab Results - Last 24 Hours (Table)











  12/28/19 12/29/19 Range/Units





  19:58 07:47 


 


Chloride   108 H  ()  mmol/L


 


BUN  28 H  21 H  (7-17)  mg/dL


 


Creatinine  1.12 H   (0.52-1.04)  mg/dL


 


Glucose  100 H   (74-99)  mg/dL


 


HDL Cholesterol   97 H  (40-60)  mg/dL














Assessment and Plan


Plan: 





Chest pain, rule out ACS


-Continue with aspirin and nitroglycerin when necessary


-Cardiac monitoring


-Cardiology consulted


-Troponin trended, negative





DOUG


-Resolved


-C/w gentle hydration





Chronic conditions: Alzheimer's dementia, hypothyroidism


-Continue with home meds


-Fall precautions





DVT prophylaxis


-Heparin subq





Anticipated discharge date: 1-2 days


Anticipated discharge place: Adult foster care

## 2019-12-30 VITALS — TEMPERATURE: 98.7 F | HEART RATE: 58 BPM | DIASTOLIC BLOOD PRESSURE: 69 MMHG | SYSTOLIC BLOOD PRESSURE: 157 MMHG

## 2019-12-30 RX ADMIN — HEPARIN SODIUM SCH: 5000 INJECTION, SOLUTION INTRAVENOUS; SUBCUTANEOUS at 16:28

## 2019-12-30 RX ADMIN — LOSARTAN POTASSIUM SCH MG: 50 TABLET, FILM COATED ORAL at 12:48

## 2019-12-30 RX ADMIN — HEPARIN SODIUM SCH UNIT: 5000 INJECTION, SOLUTION INTRAVENOUS; SUBCUTANEOUS at 09:55

## 2019-12-30 RX ADMIN — ESCITALOPRAM OXALATE SCH MG: 10 TABLET, FILM COATED ORAL at 06:07

## 2019-12-30 RX ADMIN — DONEPEZIL HYDROCHLORIDE SCH MG: 10 TABLET ORAL at 06:06

## 2019-12-30 RX ADMIN — LEVOTHYROXINE SODIUM SCH MCG: 25 TABLET ORAL at 06:07

## 2019-12-30 RX ADMIN — DOCUSATE SODIUM SCH MG: 100 CAPSULE, LIQUID FILLED ORAL at 06:07

## 2019-12-30 NOTE — P.DS
Providers


Date of admission: 


12/28/19 21:50





Expected date of discharge: 12/30/19


Attending physician: 


Kwabena Gomez MD





Consults: 





                                        





12/28/19 21:50


Consult Physician Routine 


   Consulting Provider: [facility]


   Consult Reason/Comments: CP


   Do you want consulting provider notified?: Yes











Primary care physician: 


[facility] Course: 





84-year-old female with PMH of Alzheimer's dementia, hypothyroidism presented to

the ED from an adult foster care facility due to chest pain.  When she arrived 

at the ED, her chest pain had resolved.  Troponins was less than 0.012, EKG ariella

wing normal sinus rhythm.  Chest x-ray showed no acute changes.  Cardiology was 

consulted and recommended obtaining d-dimer, echocardiogram.  D-dimer is 

slightly elevated at 1.03.  Lipid panel showed elevated HDL of 97, total 

cholesterol 168 and LDL of 60.  Cardiology recommended the addition of 

metoprolol and losartan.  She was continued on aspirin 81 mg by mouth daily.





Patient was seen and examined.  No acute events overnight.  Patient reports 

resolution of her chest pain.  She is alert and oriented 2, suspected baseline.

 Patient seems preoccupied about her breakfast.  She denies any shortness of 

breath or palpitations.  No nausea or vomiting.  No fever or chills.





General: [non toxic], [no distress], [appears at stated age]


Derm: [warm], [dry]


Head: [atraumatic], [normocephalic], [symmetric]


Eyes: [EOMI], [no lid lag], [anicteric sclera]


Mouth: [no lip lesion], [mucus membranes moist]


Cardiovascular: [S1S2 reg], [systolic murmur], [positive DP pulse bilateral], 


Lungs: [CTA bilateral], [no rhonchi, no rales] , [no accessory muscle use]


Abdominal: [soft], [ nontender to palpation], [no guarding], [no appreciable 

organomegaly]


Ext: [no gross muscle atrophy], [no edema], [no contractures]


Neuro:  [no focal neuro deficits]


Psych: [Alert and oriented 2] 





Chest pain


Hypertension


Hypothyroidism


Systolic murmur


Alzheimer's dementia





Troponin is less than 0.0123 with EKG showing normal sinus rhythm, ACS ruled 

out.  D-dimer +1.03.  Chest x-ray negative.  Plans: Cardiology recommendations 

for elevated d-dimer.  Follow echocardiogram.  Continue aspirin.  Continue beta 

blocker.  Telemetry monitoring.





/68.  Plans: Continue metoprolol.  Continue losartan.  Monitor vitals, 

adjust medications as necessary.





Plans: Resume Synthroid.





Plans: Follow echocardiogram.





Plans: Resume Aricept.  Plans for DC back to adult foster home.





[CTA chest ordered.  Echocardiogram ordered.  Plans to DC back to adult foster 

home as both within normal limits.]











Pertinent Studies: 





Chest x-ray, echocardiogram, CTA chest


Patient Condition at Discharge: Stable





Plan - Discharge Summary


Discharge Rx Participant: No


New Discharge Prescriptions: 


New


   Aspirin 81 mg PO DAILY #30 chew


   Losartan [Cozaar] 50 mg PO DAILY #30 tab


   Metoprolol Tartrate [Lopressor] 12.5 mg PO DAILY #30 tab





Continue


   Latanoprost Ophth [Xalatan 0.005%] 1 drops BOTH EYES HS@1900


   Levothyroxine Sodium [Tirosint] 25 mcg PO DAILY@0700


   QUEtiapine [SEROquel] 37.5 mg PO DAILY@0700


   Donepezil [Aricept] 10 mg PO DAILY@0700


   QUEtiapine [SEROquel] 50 mg PO DAILY@1600


   Ibuprofen [Motrin] 400 mg PO QID@04,10,16,22


   Escitalopram [Lexapro] 10 mg PO DAILY@0700


   Cholecalciferol (Vitamin D3) [Vitamin D3] 2,000 unit PO DAILY@0700


   Docusate [Colace] 100 mg PO DAILY@0700


   Benedicto/D3/Mag11/Zinc//Shamar/Bor [Caltrate 600+D Plus Tablet] 1 tab PO 

DAILY@0700


Discharge Medication List





Latanoprost Ophth [Xalatan 0.005%] 1 drops BOTH EYES HS@1900 07/23/16 [History]


Levothyroxine Sodium [Tirosint] 25 mcg PO DAILY@0700 07/23/16 [History]


Donepezil [Aricept] 10 mg PO DAILY@0700 09/29/19 [History]


QUEtiapine [SEROquel] 37.5 mg PO DAILY@0700 09/29/19 [History]


Benedicto/D3/Mag11/Zinc//Shamar/Bor [Caltrate 600+D Plus Tablet] 1 tab PO DAILY@0700 

12/28/19 [History]


Cholecalciferol (Vitamin D3) [Vitamin D3] 2,000 unit PO DAILY@0700 12/28/19 

[History]


Docusate [Colace] 100 mg PO DAILY@0700 12/28/19 [History]


Escitalopram [Lexapro] 10 mg PO DAILY@0700 12/28/19 [History]


Ibuprofen [Motrin] 400 mg PO QID@04,10,16,22 12/28/19 [History]


QUEtiapine [SEROquel] 50 mg PO DAILY@1600 12/28/19 [History]


Aspirin 81 mg PO DAILY #30 chew 12/30/19 [Rx]


Losartan [Cozaar] 50 mg PO DAILY #30 tab 12/30/19 [Rx]


Metoprolol Tartrate [Lopressor] 12.5 mg PO DAILY #30 tab 12/30/19 [Rx]








Follow up Appointment(s)/Referral(s): 


Jose Perez MD [Primary Care Provider] - 1-2 days


Mony Keller MD [STAFF PHYSICIAN] - 1 Week (Office will call with follow up 

appointment. )


Activity/Diet/Wound Care/Special Instructions: 


Diet: Cardiac


Follow-up PCP within 3 days of discharge.  Follow-up cardiology within 1 week of

discharge.  Take all medications as advised.  Come back to the ED or call 911 

for worsening chest pain, dizziness, shortness of breath or palpitations.


Discharge Disposition: HOME SELF-CARE

## 2019-12-30 NOTE — P.PN
Subjective


Progress Note Date: 12/30/19


this is a pleasant 84-year-old  female with known history of 

Alzheimer's dementia, hypothyroidism, hypertension, who presented to the 

hospital with symptoms of chest discomfort.  She had been seen in consultation 

yesterday by Dr. TRUNG Keller in his nurse practitioner.  They had requested 

yesterday for a d-dimer to be performed, he came back at 1.03.dynamically the 

patient is stable today, overall no complaints.  Because of the abnormality in 

the d-dimer, we will request a CTA of the chest be performed to rule out the 

possibility of pulmonary embolism.








Objective





- Vital Signs


Vital signs: 


                                   Vital Signs











Temp  99 F   12/30/19 12:00


 


Pulse  61   12/30/19 12:00


 


Resp  16   12/30/19 12:00


 


BP  143/67   12/30/19 12:00


 


Pulse Ox  95   12/30/19 12:00








                                 Intake & Output











 12/29/19 12/30/19 12/30/19





 18:59 06:59 18:59


 


Intake Total   222


 


Output Total   200


 


Balance   22


 


Weight 68.039 kg 42.1 kg 42.1 kg


 


Intake:   


 


  Oral   222


 


Output:   


 


  Urine   200


 


Other:   


 


  Voiding Method   Incontinent


 


  # Voids  2 














- Exam


PHYSICAL EXAMINATION: 





GENERAL:44-year-old  female in no acute distress at the time of my 

examination





HEENT: Head is atraumatic, normocephalic.  Pupils equal, round.  Sclera 

anicteric. Conjunctiva are clear.  Mucous membranes of the mouth are moist.  

Neck is supple.  There is no elevated jugular venous pressure.no carotid  bruit 

is heard.





HEART EXAMINATION: [Heart S1, S2 systolic ejection murmur is heard.]





CHEST EXAMINATION:[ Lungs are clear to auscultation and precussion. No chest 

wall tenderness is noted on palpation or with deep breathing.]





ABDOMEN: [ Soft, nontender. Bowel sounds are heard. No organomegaly noted].


 


EXTREMITIES:[ 2+ peripheral pulses with no evidence of peripheral edema and no 

calf tenderness noted].





NEUROLOGIC [patient is awake, alert, confused





- Labs


CBC & Chem 7: 


                                 12/28/19 19:58





                                 12/29/19 07:47





Assessment and Plan


Plan: 


assessment and plan


#1 atypical chest pain with negative troponins 3.  D-dimer came back elevated 

at 1.03


#2 hypertension


#3 hyperlipidemia


#4 hypothyroidism


#5 Alzheimer's dementia


#6 echocardiogram with Doppler study has been requested and is yet pending.








Plan





we will review the echocardiogram with Doppler study.  We will also request a 

CAT scan of the chest be performed to rule out possibility of pulmonary 

embolism.





DNP note has been reviewed, I agree with a documented findings and plan of care.

  Patient was seen and examined.

## 2019-12-30 NOTE — CT
EXAMINATION TYPE: CT angio chest

 

DATE OF EXAM: 12/30/2019

 

COMPARISON: Chest x-ray 2 days ago

 

HISTORY: R/O PE, SOB

 

CT DLP: 277.5 mGycm. Automated Exposure Control for Dose Reduction was Utilized.

 

 

CONTRAST: 

CTA scan of the thorax is performed with IV Contrast, patient injected with 80 mL of Isovue 370, pulm
onary embolism protocol.  MIP Images are created on CT scanner and reviewed.

 

FINDINGS:

 

LUNGS: Respiratory motion artifact that recommendation is seen making evaluation suboptimal particula
rly for subcentimeter nodules. There are tiny air trace bilateral effusions with associated compressi
ve atelectasis. Some bibasilar linear scarring and/or atelectasis. No suspicious focal consolidation 
or infiltrate. No pneumothorax. No suspicious masses.

 

MEDIASTINUM: There is satisfactory enhancement of the pulmonary artery and its branches, there is no 
CT evidence for pulmonary embolism. Enlarged main pulmonary artery, CT findings consistent with under
lying pulmonary hypertension. There are no greater than 1 cm hilar or mediastinal lymph nodes.   No c
ardiomegaly or pericardial effusion is seen. 

 

OTHER: Moderate compression fracture L1 level presumed chronic atherosclerosis. Mild chronic compress
ion fracture though present T12 level with prominent superior Schmorl node. There is additional mild 
presumed chronic compression fracture T3 level.

 

IMPRESSION: No CT evidence for acute pulmonary embolism. No suspicious acute pulmonary infiltrate.

## 2019-12-31 NOTE — ECHOF
Referral Reason:chest pain



MEASUREMENTS

--------

HEIGHT: 154.9 cm

WEIGHT: 41.7 kg

BP: 136/63

RVIDd:   2.6 cm     (< 3.3)

IVSd:   1.2 cm     (0.6 - 1.1)

LVIDd:   2.9 cm     (3.9 - 5.3)

LVPWd:   1.4 cm     (0.6 - 1.1)

IVSs:   1.6 cm

LVIDs:   1.7 cm

LVPWs:   1.6 cm

LAESV Index (A-L):   19.09 ml/m

Ao Diam:   3.0 cm     (2.0 - 3.7)

AV Cusp:   2.0 cm     (1.5 - 2.6)

LA Diam:   1.9 cm     (2.7 - 3.8)

MV EXCURSION:   15.063 mm     (> 18.000)

MV EF SLOPE:   90 mm/s     (70 - 150)

EPSS:   0.3 cm

MV E Jesus:   0.65 m/s

MV DecT:   242 ms

MV A Jesus:   0.92 m/s

MV E/A Ratio:   0.71 

AV maxP.16 mmHg

AV meanP.23 mmHg

AR PHT:   478 ms

RAP:   5.00 mmHg

RVSP:   34.92 mmHg







FINDINGS

--------

Sinus rhythm.

This was a technically adequate study.

The left ventricular size is normal.   There is mild concentric left ventricular hypertrophy.   Overa
ll left ventricular systolic function is normal with, an EF between 55 - 60 %.   The diastolic fillin
g pattern is normal for the age of the patient 8.05.

The right ventricle is normal in size.

Normal LA  size by volume 22+/-6 ml/m2.

The right atrium was not well visualized.

Interatrial and interventricular septum intact.

There is mild-to-moderate aortic regurgitation.   There is mild aortic stenosis present.   Peak/mean 
gradient across the Aortic Valve is 21.16mmHg / 11.23mmHg.

Mild mitral annular calcification present.   Mild mitral regurgitation is present.

Mild tricuspid regurgitation present.   Right ventricular systolic pressure is normal at < 35 mmHg.  
 The right ventricular systolic pressure, as measured by Doppler, is 34.92mmHg.

There is no pulmonic regurgitation present.

The aortic root size is normal.

IVC Not well visulized.

There is no pericardial effusion.



CONCLUSIONS

--------

1. Sinus rhythm.

2. This was a technically adequate study.

3. The left ventricular size is normal.

4. There is mild concentric left ventricular hypertrophy.

5. Overall left ventricular systolic function is normal with, an EF between 55 - 60 %.

6. The diastolic filling pattern is normal for the age of the patient 8.05

7. Normal LA size by volume 22+/-6 ml/m2.

8. There is mild-to-moderate aortic regurgitation.

9. There is mild aortic stenosis present.

10. Peak/mean gradient across the Aortic Valve is 21.16mmHg / 11.23mmHg.

11. Mild mitral annular calcification present.

12. Mild mitral regurgitation is present.

13. Mild tricuspid regurgitation present.

14. Right ventricular systolic pressure is normal at < 35 mmHg.

15. There is no pulmonic regurgitation present.

16. There is no pericardial effusion.





SONOGRAPHER: Madelin Gannon RDCS